# Patient Record
Sex: FEMALE | Race: WHITE | URBAN - METROPOLITAN AREA
[De-identification: names, ages, dates, MRNs, and addresses within clinical notes are randomized per-mention and may not be internally consistent; named-entity substitution may affect disease eponyms.]

---

## 2019-10-25 ENCOUNTER — TELEPHONE (OUTPATIENT)
Dept: FAMILY MEDICINE CLINIC | Facility: CLINIC | Age: 29
End: 2019-10-25

## 2019-10-25 NOTE — TELEPHONE ENCOUNTER
Needs her vaccination records  [facility]  Pt aware she will need to sign a records release form

## 2019-10-30 NOTE — TELEPHONE ENCOUNTER
Received a record release form for her from [employer] - faxed back pe and stated no immunizations on file - looked in all systems including paper chart